# Patient Record
Sex: MALE | Race: WHITE | NOT HISPANIC OR LATINO | Employment: OTHER | URBAN - METROPOLITAN AREA
[De-identification: names, ages, dates, MRNs, and addresses within clinical notes are randomized per-mention and may not be internally consistent; named-entity substitution may affect disease eponyms.]

---

## 2017-04-17 ENCOUNTER — APPOINTMENT (OUTPATIENT)
Dept: LAB | Facility: HOSPITAL | Age: 82
End: 2017-04-17
Payer: MEDICARE

## 2017-04-17 ENCOUNTER — TRANSCRIBE ORDERS (OUTPATIENT)
Dept: ADMINISTRATIVE | Facility: HOSPITAL | Age: 82
End: 2017-04-17

## 2017-04-17 DIAGNOSIS — N40.0 BENIGN PROSTATIC HYPERPLASIA, PRESENCE OF LOWER URINARY TRACT SYMPTOMS UNSPECIFIED, UNSPECIFIED MORPHOLOGY: ICD-10-CM

## 2017-04-17 DIAGNOSIS — N40.0 BENIGN PROSTATIC HYPERPLASIA, PRESENCE OF LOWER URINARY TRACT SYMPTOMS UNSPECIFIED, UNSPECIFIED MORPHOLOGY: Primary | ICD-10-CM

## 2017-04-17 LAB — PSA SERPL-MCNC: 6.4 NG/ML (ref 0–4)

## 2017-04-17 PROCEDURE — 36415 COLL VENOUS BLD VENIPUNCTURE: CPT

## 2017-04-17 PROCEDURE — G0103 PSA SCREENING: HCPCS

## 2017-04-24 ENCOUNTER — APPOINTMENT (OUTPATIENT)
Dept: LAB | Facility: HOSPITAL | Age: 82
End: 2017-04-24
Payer: MEDICARE

## 2017-04-24 ENCOUNTER — TRANSCRIBE ORDERS (OUTPATIENT)
Dept: ADMINISTRATIVE | Facility: HOSPITAL | Age: 82
End: 2017-04-24

## 2017-04-24 DIAGNOSIS — D64.9 ANEMIA, UNSPECIFIED: ICD-10-CM

## 2017-04-24 DIAGNOSIS — E13.9 OTHER SPECIFIED DIABETES MELLITUS: Primary | ICD-10-CM

## 2017-04-24 DIAGNOSIS — E13.9 OTHER SPECIFIED DIABETES MELLITUS: ICD-10-CM

## 2017-04-24 DIAGNOSIS — N40.0 BENIGN PROSTATIC HYPERPLASIA, PRESENCE OF LOWER URINARY TRACT SYMPTOMS UNSPECIFIED, UNSPECIFIED MORPHOLOGY: ICD-10-CM

## 2017-04-24 LAB
ALBUMIN SERPL BCP-MCNC: 3.5 G/DL (ref 3.5–5)
ALP SERPL-CCNC: 78 U/L (ref 46–116)
ALT SERPL W P-5'-P-CCNC: 27 U/L (ref 12–78)
ANION GAP SERPL CALCULATED.3IONS-SCNC: 3 MMOL/L (ref 4–13)
AST SERPL W P-5'-P-CCNC: 28 U/L (ref 5–45)
BASOPHILS # BLD AUTO: 0.03 THOUSANDS/ΜL (ref 0–0.1)
BASOPHILS NFR BLD AUTO: 1 % (ref 0–1)
BILIRUB SERPL-MCNC: 1.2 MG/DL (ref 0.2–1)
BUN SERPL-MCNC: 23 MG/DL (ref 5–25)
CALCIUM SERPL-MCNC: 8.7 MG/DL (ref 8.3–10.1)
CHLORIDE SERPL-SCNC: 109 MMOL/L (ref 100–108)
CO2 SERPL-SCNC: 30 MMOL/L (ref 21–32)
CREAT SERPL-MCNC: 0.8 MG/DL (ref 0.6–1.3)
EOSINOPHIL # BLD AUTO: 0.08 THOUSAND/ΜL (ref 0–0.61)
EOSINOPHIL NFR BLD AUTO: 2 % (ref 0–6)
ERYTHROCYTE [DISTWIDTH] IN BLOOD BY AUTOMATED COUNT: 13.9 % (ref 11.6–15.1)
GFR SERPL CREATININE-BSD FRML MDRD: >60 ML/MIN/1.73SQ M
GLUCOSE P FAST SERPL-MCNC: 132 MG/DL (ref 65–99)
HCT VFR BLD AUTO: 39.9 % (ref 36.5–49.3)
HGB BLD-MCNC: 13 G/DL (ref 12–17)
LYMPHOCYTES # BLD AUTO: 2.06 THOUSANDS/ΜL (ref 0.6–4.47)
LYMPHOCYTES NFR BLD AUTO: 40 % (ref 14–44)
MCH RBC QN AUTO: 34.8 PG (ref 26.8–34.3)
MCHC RBC AUTO-ENTMCNC: 32.6 G/DL (ref 31.4–37.4)
MCV RBC AUTO: 107 FL (ref 82–98)
MONOCYTES # BLD AUTO: 0.7 THOUSAND/ΜL (ref 0.17–1.22)
MONOCYTES NFR BLD AUTO: 14 % (ref 4–12)
NEUTROPHILS # BLD AUTO: 2.27 THOUSANDS/ΜL (ref 1.85–7.62)
NEUTS SEG NFR BLD AUTO: 43 % (ref 43–75)
PLATELET # BLD AUTO: 109 THOUSANDS/UL (ref 149–390)
PMV BLD AUTO: 11.7 FL (ref 8.9–12.7)
POTASSIUM SERPL-SCNC: 4.6 MMOL/L (ref 3.5–5.3)
PROT SERPL-MCNC: 7 G/DL (ref 6.4–8.2)
PSA SERPL-MCNC: 6 NG/ML (ref 0–4)
RBC # BLD AUTO: 3.74 MILLION/UL (ref 3.88–5.62)
SODIUM SERPL-SCNC: 142 MMOL/L (ref 136–145)
WBC # BLD AUTO: 5.14 THOUSAND/UL (ref 4.31–10.16)

## 2017-04-24 PROCEDURE — 80053 COMPREHEN METABOLIC PANEL: CPT

## 2017-04-24 PROCEDURE — 36415 COLL VENOUS BLD VENIPUNCTURE: CPT

## 2017-04-24 PROCEDURE — G0103 PSA SCREENING: HCPCS

## 2017-04-24 PROCEDURE — 85025 COMPLETE CBC W/AUTO DIFF WBC: CPT

## 2017-09-11 ENCOUNTER — TRANSCRIBE ORDERS (OUTPATIENT)
Dept: ADMINISTRATIVE | Facility: HOSPITAL | Age: 82
End: 2017-09-11

## 2017-09-11 ENCOUNTER — LAB (OUTPATIENT)
Dept: LAB | Facility: HOSPITAL | Age: 82
End: 2017-09-11
Payer: MEDICARE

## 2017-09-11 DIAGNOSIS — E78.00 PURE HYPERCHOLESTEROLEMIA: ICD-10-CM

## 2017-09-11 DIAGNOSIS — E13.9 OTHER SPECIFIED DIABETES MELLITUS: ICD-10-CM

## 2017-09-11 DIAGNOSIS — E78.00 PURE HYPERCHOLESTEROLEMIA: Primary | ICD-10-CM

## 2017-09-11 DIAGNOSIS — N40.0 BENIGN PROSTATIC HYPERPLASIA, PRESENCE OF LOWER URINARY TRACT SYMPTOMS UNSPECIFIED, UNSPECIFIED MORPHOLOGY: ICD-10-CM

## 2017-09-11 LAB
ALBUMIN SERPL BCP-MCNC: 3.5 G/DL (ref 3.5–5)
ALP SERPL-CCNC: 77 U/L (ref 46–116)
ALT SERPL W P-5'-P-CCNC: 23 U/L (ref 12–78)
ANION GAP SERPL CALCULATED.3IONS-SCNC: 4 MMOL/L (ref 4–13)
AST SERPL W P-5'-P-CCNC: 30 U/L (ref 5–45)
BACTERIA UR QL AUTO: ABNORMAL /HPF
BASOPHILS # BLD AUTO: 0.03 THOUSANDS/ΜL (ref 0–0.1)
BASOPHILS NFR BLD AUTO: 1 % (ref 0–1)
BILIRUB SERPL-MCNC: 1.5 MG/DL (ref 0.2–1)
BILIRUB UR QL STRIP: NEGATIVE
BUN SERPL-MCNC: 26 MG/DL (ref 5–25)
CALCIUM SERPL-MCNC: 8.9 MG/DL (ref 8.3–10.1)
CHLORIDE SERPL-SCNC: 103 MMOL/L (ref 100–108)
CHOLEST SERPL-MCNC: 159 MG/DL (ref 50–200)
CLARITY UR: CLEAR
CO2 SERPL-SCNC: 32 MMOL/L (ref 21–32)
COLOR UR: YELLOW
CREAT SERPL-MCNC: 0.84 MG/DL (ref 0.6–1.3)
EOSINOPHIL # BLD AUTO: 0.05 THOUSAND/ΜL (ref 0–0.61)
EOSINOPHIL NFR BLD AUTO: 1 % (ref 0–6)
ERYTHROCYTE [DISTWIDTH] IN BLOOD BY AUTOMATED COUNT: 13.7 % (ref 11.6–15.1)
GFR SERPL CREATININE-BSD FRML MDRD: 78 ML/MIN/1.73SQ M
GLUCOSE P FAST SERPL-MCNC: 111 MG/DL (ref 65–99)
GLUCOSE UR STRIP-MCNC: NEGATIVE MG/DL
HCT VFR BLD AUTO: 38.6 % (ref 36.5–49.3)
HDLC SERPL-MCNC: 71 MG/DL (ref 40–60)
HGB BLD-MCNC: 12.8 G/DL (ref 12–17)
HGB UR QL STRIP.AUTO: NEGATIVE
KETONES UR STRIP-MCNC: NEGATIVE MG/DL
LDLC SERPL CALC-MCNC: 81 MG/DL (ref 0–100)
LEUKOCYTE ESTERASE UR QL STRIP: NEGATIVE
LYMPHOCYTES # BLD AUTO: 1.69 THOUSANDS/ΜL (ref 0.6–4.47)
LYMPHOCYTES NFR BLD AUTO: 37 % (ref 14–44)
MCH RBC QN AUTO: 35.1 PG (ref 26.8–34.3)
MCHC RBC AUTO-ENTMCNC: 33.2 G/DL (ref 31.4–37.4)
MCV RBC AUTO: 106 FL (ref 82–98)
MONOCYTES # BLD AUTO: 0.67 THOUSAND/ΜL (ref 0.17–1.22)
MONOCYTES NFR BLD AUTO: 15 % (ref 4–12)
NEUTROPHILS # BLD AUTO: 2.09 THOUSANDS/ΜL (ref 1.85–7.62)
NEUTS SEG NFR BLD AUTO: 46 % (ref 43–75)
NITRITE UR QL STRIP: NEGATIVE
NON-SQ EPI CELLS URNS QL MICRO: ABNORMAL /HPF
PH UR STRIP.AUTO: 7 [PH] (ref 4.5–8)
PLATELET # BLD AUTO: 120 THOUSANDS/UL (ref 149–390)
PMV BLD AUTO: 11.8 FL (ref 8.9–12.7)
POTASSIUM SERPL-SCNC: 4.1 MMOL/L (ref 3.5–5.3)
PROT SERPL-MCNC: 7 G/DL (ref 6.4–8.2)
PROT UR STRIP-MCNC: ABNORMAL MG/DL
PSA SERPL-MCNC: 7.1 NG/ML (ref 0–4)
RBC # BLD AUTO: 3.65 MILLION/UL (ref 3.88–5.62)
RBC #/AREA URNS AUTO: ABNORMAL /HPF
SODIUM SERPL-SCNC: 139 MMOL/L (ref 136–145)
SP GR UR STRIP.AUTO: 1.01 (ref 1–1.03)
T4 FREE SERPL-MCNC: 1.28 NG/DL (ref 0.76–1.46)
TRIGL SERPL-MCNC: 36 MG/DL
TSH SERPL DL<=0.05 MIU/L-ACNC: 4.55 UIU/ML (ref 0.36–3.74)
UROBILINOGEN UR QL STRIP.AUTO: 0.2 E.U./DL
WBC # BLD AUTO: 4.53 THOUSAND/UL (ref 4.31–10.16)
WBC #/AREA URNS AUTO: ABNORMAL /HPF

## 2017-09-11 PROCEDURE — 80061 LIPID PANEL: CPT

## 2017-09-11 PROCEDURE — 36415 COLL VENOUS BLD VENIPUNCTURE: CPT

## 2017-09-11 PROCEDURE — 81001 URINALYSIS AUTO W/SCOPE: CPT | Performed by: FAMILY MEDICINE

## 2017-09-11 PROCEDURE — 84443 ASSAY THYROID STIM HORMONE: CPT

## 2017-09-11 PROCEDURE — 84439 ASSAY OF FREE THYROXINE: CPT

## 2017-09-11 PROCEDURE — G0103 PSA SCREENING: HCPCS

## 2017-09-11 PROCEDURE — 85025 COMPLETE CBC W/AUTO DIFF WBC: CPT

## 2017-09-11 PROCEDURE — 80053 COMPREHEN METABOLIC PANEL: CPT

## 2018-01-01 ENCOUNTER — TRANSCRIBE ORDERS (OUTPATIENT)
Dept: ADMINISTRATIVE | Facility: HOSPITAL | Age: 83
End: 2018-01-01

## 2018-01-01 ENCOUNTER — APPOINTMENT (OUTPATIENT)
Dept: LAB | Facility: HOSPITAL | Age: 83
End: 2018-01-01
Payer: MEDICARE

## 2018-01-01 DIAGNOSIS — I51.9 MYXEDEMA HEART DISEASE: ICD-10-CM

## 2018-01-01 DIAGNOSIS — R53.83 FATIGUE DUE TO DEPRESSION: Primary | ICD-10-CM

## 2018-01-01 DIAGNOSIS — D50.0 IRON DEFICIENCY ANEMIA DUE TO CHRONIC BLOOD LOSS: ICD-10-CM

## 2018-01-01 DIAGNOSIS — E08.00 DIABETES MELLITUS DUE TO UNDERLYING CONDITION WITH HYPEROSMOLARITY WITHOUT COMA, WITHOUT LONG-TERM CURRENT USE OF INSULIN (HCC): ICD-10-CM

## 2018-01-01 DIAGNOSIS — F32.A FATIGUE DUE TO DEPRESSION: ICD-10-CM

## 2018-01-01 DIAGNOSIS — R53.83 FATIGUE DUE TO DEPRESSION: ICD-10-CM

## 2018-01-01 DIAGNOSIS — F32.A FATIGUE DUE TO DEPRESSION: Primary | ICD-10-CM

## 2018-01-01 DIAGNOSIS — E78.00 PURE HYPERCHOLESTEROLEMIA: ICD-10-CM

## 2018-01-01 DIAGNOSIS — E03.9 MYXEDEMA HEART DISEASE: ICD-10-CM

## 2018-01-01 DIAGNOSIS — E08.00 DIABETES MELLITUS DUE TO UNDERLYING CONDITION WITH HYPEROSMOLARITY WITHOUT COMA, WITHOUT LONG-TERM CURRENT USE OF INSULIN (HCC): Primary | ICD-10-CM

## 2018-01-01 LAB
25(OH)D3 SERPL-MCNC: 30.2 NG/ML (ref 30–100)
ALBUMIN SERPL BCP-MCNC: 3.3 G/DL (ref 3.5–5)
ALP SERPL-CCNC: 79 U/L (ref 46–116)
ALT SERPL W P-5'-P-CCNC: 19 U/L (ref 12–78)
ANION GAP SERPL CALCULATED.3IONS-SCNC: 7 MMOL/L (ref 4–13)
AST SERPL W P-5'-P-CCNC: 27 U/L (ref 5–45)
BASOPHILS # BLD AUTO: 0.05 THOUSANDS/ΜL (ref 0–0.1)
BASOPHILS NFR BLD AUTO: 1 % (ref 0–1)
BILIRUB SERPL-MCNC: 1.2 MG/DL (ref 0.2–1)
BUN SERPL-MCNC: 23 MG/DL (ref 5–25)
CALCIUM SERPL-MCNC: 8.2 MG/DL (ref 8.3–10.1)
CHLORIDE SERPL-SCNC: 102 MMOL/L (ref 100–108)
CHOLEST SERPL-MCNC: 159 MG/DL (ref 50–200)
CO2 SERPL-SCNC: 30 MMOL/L (ref 21–32)
CREAT SERPL-MCNC: 0.87 MG/DL (ref 0.6–1.3)
EOSINOPHIL # BLD AUTO: 0.07 THOUSAND/ΜL (ref 0–0.61)
EOSINOPHIL NFR BLD AUTO: 2 % (ref 0–6)
ERYTHROCYTE [DISTWIDTH] IN BLOOD BY AUTOMATED COUNT: 13.2 % (ref 11.6–15.1)
EST. AVERAGE GLUCOSE BLD GHB EST-MCNC: 126 MG/DL
FOLATE SERPL-MCNC: >20 NG/ML (ref 3.1–17.5)
GFR SERPL CREATININE-BSD FRML MDRD: 76 ML/MIN/1.73SQ M
GLUCOSE P FAST SERPL-MCNC: 116 MG/DL (ref 65–99)
HBA1C MFR BLD: 6 % (ref 4.2–6.3)
HCT VFR BLD AUTO: 39.9 % (ref 36.5–49.3)
HDLC SERPL-MCNC: 64 MG/DL (ref 40–60)
HGB BLD-MCNC: 13.5 G/DL (ref 12–17)
LDLC SERPL CALC-MCNC: 85 MG/DL (ref 0–100)
LYMPHOCYTES # BLD AUTO: 1.52 THOUSANDS/ΜL (ref 0.6–4.47)
LYMPHOCYTES NFR BLD AUTO: 33 % (ref 14–44)
MCH RBC QN AUTO: 36.1 PG (ref 26.8–34.3)
MCHC RBC AUTO-ENTMCNC: 33.8 G/DL (ref 31.4–37.4)
MCV RBC AUTO: 107 FL (ref 82–98)
MONOCYTES # BLD AUTO: 0.65 THOUSAND/ΜL (ref 0.17–1.22)
MONOCYTES NFR BLD AUTO: 14 % (ref 4–12)
NEUTROPHILS # BLD AUTO: 2.31 THOUSANDS/ΜL (ref 1.85–7.62)
NEUTS SEG NFR BLD AUTO: 50 % (ref 43–75)
PLATELET # BLD AUTO: 132 THOUSANDS/UL (ref 149–390)
PMV BLD AUTO: 11.4 FL (ref 8.9–12.7)
POTASSIUM SERPL-SCNC: 4.1 MMOL/L (ref 3.5–5.3)
PROT SERPL-MCNC: 7.1 G/DL (ref 6.4–8.2)
PSA SERPL-MCNC: 7.6 NG/ML (ref 0–4)
RBC # BLD AUTO: 3.74 MILLION/UL (ref 3.88–5.62)
SODIUM SERPL-SCNC: 139 MMOL/L (ref 136–145)
T4 FREE SERPL-MCNC: 1.23 NG/DL (ref 0.76–1.46)
TRIGL SERPL-MCNC: 52 MG/DL
TSH SERPL DL<=0.05 MIU/L-ACNC: 4.55 UIU/ML (ref 0.36–3.74)
VIT B12 SERPL-MCNC: 504 PG/ML (ref 100–900)
WBC # BLD AUTO: 4.6 THOUSAND/UL (ref 4.31–10.16)

## 2018-01-01 PROCEDURE — 82607 VITAMIN B-12: CPT

## 2018-01-01 PROCEDURE — 83036 HEMOGLOBIN GLYCOSYLATED A1C: CPT

## 2018-01-01 PROCEDURE — 85025 COMPLETE CBC W/AUTO DIFF WBC: CPT

## 2018-01-01 PROCEDURE — G0103 PSA SCREENING: HCPCS

## 2018-01-01 PROCEDURE — 84439 ASSAY OF FREE THYROXINE: CPT

## 2018-01-01 PROCEDURE — 84443 ASSAY THYROID STIM HORMONE: CPT

## 2018-01-01 PROCEDURE — 80061 LIPID PANEL: CPT

## 2018-01-01 PROCEDURE — 82746 ASSAY OF FOLIC ACID SERUM: CPT

## 2018-01-01 PROCEDURE — 36415 COLL VENOUS BLD VENIPUNCTURE: CPT

## 2018-01-01 PROCEDURE — 80053 COMPREHEN METABOLIC PANEL: CPT

## 2018-01-01 PROCEDURE — 82306 VITAMIN D 25 HYDROXY: CPT

## 2019-01-01 ENCOUNTER — APPOINTMENT (OUTPATIENT)
Dept: LAB | Facility: HOSPITAL | Age: 84
End: 2019-01-01
Payer: MEDICARE

## 2019-01-01 ENCOUNTER — DOCUMENTATION (OUTPATIENT)
Dept: MEDSURG UNIT | Facility: HOSPITAL | Age: 84
End: 2019-01-01

## 2019-01-01 ENCOUNTER — HOSPITAL ENCOUNTER (OUTPATIENT)
Dept: PULMONOLOGY | Facility: HOSPITAL | Age: 84
Discharge: HOME | End: 2019-02-04
Payer: MEDICARE

## 2019-01-01 ENCOUNTER — APPOINTMENT (EMERGENCY)
Dept: CT IMAGING | Facility: HOSPITAL | Age: 84
DRG: 291 | End: 2019-01-01
Payer: MEDICARE

## 2019-01-01 ENCOUNTER — HOSPITAL ENCOUNTER (INPATIENT)
Facility: HOSPITAL | Age: 84
LOS: 1 days | DRG: 291 | End: 2019-02-12
Attending: EMERGENCY MEDICINE | Admitting: INTERNAL MEDICINE
Payer: MEDICARE

## 2019-01-01 ENCOUNTER — HOSPITAL ENCOUNTER (EMERGENCY)
Facility: HOSPITAL | Age: 84
Discharge: HOME | DRG: 291 | End: 2019-02-09
Attending: EMERGENCY MEDICINE | Admitting: EMERGENCY MEDICINE
Payer: MEDICARE

## 2019-01-01 ENCOUNTER — TRANSCRIBE ORDERS (OUTPATIENT)
Dept: ADMINISTRATIVE | Facility: HOSPITAL | Age: 84
End: 2019-01-01

## 2019-01-01 ENCOUNTER — HOSPITAL ENCOUNTER (OUTPATIENT)
Dept: RADIOLOGY | Facility: HOSPITAL | Age: 84
Discharge: HOME | End: 2019-01-16
Payer: MEDICARE

## 2019-01-01 VITALS
BODY MASS INDEX: 32.2 KG/M2 | HEART RATE: 75 BPM | OXYGEN SATURATION: 96 % | DIASTOLIC BLOOD PRESSURE: 58 MMHG | WEIGHT: 175 LBS | SYSTOLIC BLOOD PRESSURE: 112 MMHG | RESPIRATION RATE: 14 BRPM | HEIGHT: 62 IN | TEMPERATURE: 97.9 F

## 2019-01-01 VITALS
TEMPERATURE: 97.5 F | HEIGHT: 62 IN | DIASTOLIC BLOOD PRESSURE: 71 MMHG | OXYGEN SATURATION: 72 % | WEIGHT: 175 LBS | HEART RATE: 113 BPM | BODY MASS INDEX: 32.2 KG/M2 | SYSTOLIC BLOOD PRESSURE: 135 MMHG | RESPIRATION RATE: 22 BRPM

## 2019-01-01 DIAGNOSIS — R41.82 ALTERED MENTAL STATUS, UNSPECIFIED ALTERED MENTAL STATUS TYPE: Primary | ICD-10-CM

## 2019-01-01 DIAGNOSIS — J44.9 COPD (CHRONIC OBSTRUCTIVE PULMONARY DISEASE) (HCC): ICD-10-CM

## 2019-01-01 DIAGNOSIS — S20.212A CONTUSION OF LEFT CHEST WALL, INITIAL ENCOUNTER: ICD-10-CM

## 2019-01-01 DIAGNOSIS — I50.22 CHRONIC SYSTOLIC CONGESTIVE HEART FAILURE (HCC): ICD-10-CM

## 2019-01-01 DIAGNOSIS — R79.89 HYPOURICEMIA: ICD-10-CM

## 2019-01-01 DIAGNOSIS — R05.9 COUGH: ICD-10-CM

## 2019-01-01 DIAGNOSIS — S00.83XA CONTUSION OF FACE, INITIAL ENCOUNTER: ICD-10-CM

## 2019-01-01 DIAGNOSIS — I42.9 FAMILIAL CARDIOMYOPATHY (HCC): Primary | ICD-10-CM

## 2019-01-01 DIAGNOSIS — I42.9 FAMILIAL CARDIOMYOPATHY (HCC): ICD-10-CM

## 2019-01-01 DIAGNOSIS — R05.9 COUGH: Primary | ICD-10-CM
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
## 2019-01-01 LAB
ALBUMIN SERPL BCP-MCNC: 3 G/DL (ref 3.5–5)
ALBUMIN SERPL BCP-MCNC: 3.2 G/DL (ref 3.5–5)
ALP SERPL-CCNC: 120 U/L (ref 46–116)
ALP SERPL-CCNC: 91 U/L (ref 46–116)
ALT SERPL W P-5'-P-CCNC: 27 U/L (ref 12–78)
ALT SERPL W P-5'-P-CCNC: 35 U/L (ref 12–78)
ANION GAP SERPL CALCULATED.3IONS-SCNC: 5 MMOL/L (ref 4–13)
ANION GAP SERPL CALCULATED.3IONS-SCNC: 7 MMOL/L (ref 4–13)
APTT PPP: 32 SECONDS (ref 26–38)
ARTERIAL PATENCY WRIST A: ABNORMAL
AST SERPL W P-5'-P-CCNC: 30 U/L (ref 5–45)
AST SERPL W P-5'-P-CCNC: 56 U/L (ref 5–45)
ATRIAL RATE: 94 BPM
BASE EXCESS BLDA CALC-SCNC: 4 MMOL/L (ref -2–3)
BASOPHILS # BLD AUTO: 0.03 THOUSANDS/ΜL (ref 0–0.1)
BASOPHILS # BLD AUTO: 0.06 THOUSANDS/ΜL (ref 0–0.1)
BASOPHILS NFR BLD AUTO: 0 % (ref 0–1)
BASOPHILS NFR BLD AUTO: 1 % (ref 0–1)
BILIRUB DIRECT SERPL-MCNC: 0.83 MG/DL (ref 0–0.2)
BILIRUB SERPL-MCNC: 1.5 MG/DL (ref 0.2–1)
BILIRUB SERPL-MCNC: 2.1 MG/DL (ref 0.2–1)
BILIRUB UR QL STRIP: NEGATIVE
BUN SERPL-MCNC: 29 MG/DL (ref 5–25)
BUN SERPL-MCNC: 32 MG/DL (ref 5–25)
CALCIUM SERPL-MCNC: 8.5 MG/DL (ref 8.3–10.1)
CALCIUM SERPL-MCNC: 8.9 MG/DL (ref 8.3–10.1)
CHLORIDE SERPL-SCNC: 100 MMOL/L (ref 100–108)
CHLORIDE SERPL-SCNC: 102 MMOL/L (ref 100–108)
CLARITY UR: CLEAR
CO2 SERPL-SCNC: 32 MMOL/L (ref 21–32)
CO2 SERPL-SCNC: 34 MMOL/L (ref 21–32)
COLOR UR: YELLOW
CREAT SERPL-MCNC: 1.05 MG/DL (ref 0.6–1.3)
CREAT SERPL-MCNC: 1.17 MG/DL (ref 0.6–1.3)
EOSINOPHIL # BLD AUTO: 0.03 THOUSAND/ΜL (ref 0–0.61)
EOSINOPHIL # BLD AUTO: 0.05 THOUSAND/ΜL (ref 0–0.61)
EOSINOPHIL NFR BLD AUTO: 0 % (ref 0–6)
EOSINOPHIL NFR BLD AUTO: 1 % (ref 0–6)
ERYTHROCYTE [DISTWIDTH] IN BLOOD BY AUTOMATED COUNT: 14.7 % (ref 11.6–15.1)
ERYTHROCYTE [DISTWIDTH] IN BLOOD BY AUTOMATED COUNT: 15.6 % (ref 11.6–15.1)
GFR SERPL CREATININE-BSD FRML MDRD: 54 ML/MIN/1.73SQ M
GFR SERPL CREATININE-BSD FRML MDRD: 62 ML/MIN/1.73SQ M
GLUCOSE SERPL-MCNC: 108 MG/DL (ref 65–140)
GLUCOSE SERPL-MCNC: 135 MG/DL (ref 65–140)
GLUCOSE UR STRIP-MCNC: NEGATIVE MG/DL
HCO3 BLDA-SCNC: 27.9 MMOL/L (ref 22–28)
HCT VFR BLD AUTO: 37.2 % (ref 36.5–49.3)
HCT VFR BLD AUTO: 38.2 % (ref 36.5–49.3)
HGB BLD-MCNC: 12.3 G/DL (ref 12–17)
HGB BLD-MCNC: 12.5 G/DL (ref 12–17)
HGB UR QL STRIP.AUTO: NEGATIVE
IMM GRANULOCYTES # BLD AUTO: 0.03 THOUSAND/UL (ref 0–0.2)
IMM GRANULOCYTES # BLD AUTO: 0.03 THOUSAND/UL (ref 0–0.2)
IMM GRANULOCYTES NFR BLD AUTO: 0 % (ref 0–2)
IMM GRANULOCYTES NFR BLD AUTO: 1 % (ref 0–2)
INR PPP: 1.19 (ref 0.86–1.17)
KETONES UR STRIP-MCNC: NEGATIVE MG/DL
LEUKOCYTE ESTERASE UR QL STRIP: NEGATIVE
LYMPHOCYTES # BLD AUTO: 0.81 THOUSANDS/ΜL (ref 0.6–4.47)
LYMPHOCYTES # BLD AUTO: 1.39 THOUSANDS/ΜL (ref 0.6–4.47)
LYMPHOCYTES NFR BLD AUTO: 10 % (ref 14–44)
LYMPHOCYTES NFR BLD AUTO: 23 % (ref 14–44)
MCH RBC QN AUTO: 36.1 PG (ref 26.8–34.3)
MCH RBC QN AUTO: 36.4 PG (ref 26.8–34.3)
MCHC RBC AUTO-ENTMCNC: 32.7 G/DL (ref 31.4–37.4)
MCHC RBC AUTO-ENTMCNC: 33.1 G/DL (ref 31.4–37.4)
MCV RBC AUTO: 110 FL (ref 82–98)
MCV RBC AUTO: 110 FL (ref 82–98)
MONOCYTES # BLD AUTO: 1.11 THOUSAND/ΜL (ref 0.17–1.22)
MONOCYTES # BLD AUTO: 1.25 THOUSAND/ΜL (ref 0.17–1.22)
MONOCYTES NFR BLD AUTO: 15 % (ref 4–12)
MONOCYTES NFR BLD AUTO: 19 % (ref 4–12)
NEUTROPHILS # BLD AUTO: 3.29 THOUSANDS/ΜL (ref 1.85–7.62)
NEUTROPHILS # BLD AUTO: 5.98 THOUSANDS/ΜL (ref 1.85–7.62)
NEUTS SEG NFR BLD AUTO: 55 % (ref 43–75)
NEUTS SEG NFR BLD AUTO: 75 % (ref 43–75)
NITRITE UR QL STRIP: NEGATIVE
NRBC BLD AUTO-RTO: 0 /100 WBCS
NRBC BLD AUTO-RTO: 0 /100 WBCS
NT-PROBNP SERPL-MCNC: 6955 PG/ML
PCO2 BLD: 29 MMOL/L (ref 21–32)
PCO2 BLD: 40.4 MM HG (ref 36–44)
PH BLD: 7.45 [PH] (ref 7.35–7.45)
PH UR STRIP.AUTO: 6 [PH] (ref 4.5–8)
PLATELET # BLD AUTO: 124 THOUSANDS/UL (ref 149–390)
PLATELET # BLD AUTO: 127 THOUSANDS/UL (ref 149–390)
PMV BLD AUTO: 12.7 FL (ref 8.9–12.7)
PMV BLD AUTO: 13.3 FL (ref 8.9–12.7)
PO2 BLD: 79 MM HG (ref 75–129)
POTASSIUM SERPL-SCNC: 3.8 MMOL/L (ref 3.5–5.3)
POTASSIUM SERPL-SCNC: 4.6 MMOL/L (ref 3.5–5.3)
PROT SERPL-MCNC: 6.9 G/DL (ref 6.4–8.2)
PROT SERPL-MCNC: 7 G/DL (ref 6.4–8.2)
PROT UR STRIP-MCNC: NEGATIVE MG/DL
PROTHROMBIN TIME: 14.4 SECONDS (ref 11.8–14.2)
QRS AXIS: -57 DEGREES
QRSD INTERVAL: 156 MS
QT INTERVAL: 426 MS
QTC INTERVAL: 500 MS
RBC # BLD AUTO: 3.38 MILLION/UL (ref 3.88–5.62)
RBC # BLD AUTO: 3.46 MILLION/UL (ref 3.88–5.62)
SAMPLE SITE: ABNORMAL
SAO2 % BLD FROM PO2: 96 % (ref 95–98)
SODIUM SERPL-SCNC: 139 MMOL/L (ref 136–145)
SODIUM SERPL-SCNC: 141 MMOL/L (ref 136–145)
SP GR UR STRIP.AUTO: 1.01 (ref 1–1.03)
SPECIMEN SOURCE: ABNORMAL
T WAVE AXIS: 152 DEGREES
TROPONIN I SERPL-MCNC: 0.05 NG/ML
UROBILINOGEN UR QL STRIP.AUTO: 1 E.U./DL
VENTRICULAR RATE: 83 BPM
WBC # BLD AUTO: 5.93 THOUSAND/UL (ref 4.31–10.16)
WBC # BLD AUTO: 8.13 THOUSAND/UL (ref 4.31–10.16)

## 2019-01-01 PROCEDURE — 99232 SBSQ HOSP IP/OBS MODERATE 35: CPT | Performed by: INTERNAL MEDICINE

## 2019-01-01 PROCEDURE — 70450 CT HEAD/BRAIN W/O DYE: CPT

## 2019-01-01 PROCEDURE — 99285 EMERGENCY DEPT VISIT HI MDM: CPT

## 2019-01-01 PROCEDURE — 82803 BLOOD GASES ANY COMBINATION: CPT

## 2019-01-01 PROCEDURE — 99284 EMERGENCY DEPT VISIT MOD MDM: CPT

## 2019-01-01 PROCEDURE — 99223 1ST HOSP IP/OBS HIGH 75: CPT | Performed by: PHYSICIAN ASSISTANT

## 2019-01-01 PROCEDURE — 85610 PROTHROMBIN TIME: CPT | Performed by: EMERGENCY MEDICINE

## 2019-01-01 PROCEDURE — 36415 COLL VENOUS BLD VENIPUNCTURE: CPT | Performed by: EMERGENCY MEDICINE

## 2019-01-01 PROCEDURE — 80048 BASIC METABOLIC PNL TOTAL CA: CPT | Performed by: EMERGENCY MEDICINE

## 2019-01-01 PROCEDURE — 71046 X-RAY EXAM CHEST 2 VIEWS: CPT

## 2019-01-01 PROCEDURE — 85025 COMPLETE CBC W/AUTO DIFF WBC: CPT | Performed by: EMERGENCY MEDICINE

## 2019-01-01 PROCEDURE — 85730 THROMBOPLASTIN TIME PARTIAL: CPT | Performed by: EMERGENCY MEDICINE

## 2019-01-01 PROCEDURE — 83880 ASSAY OF NATRIURETIC PEPTIDE: CPT

## 2019-01-01 PROCEDURE — 93010 ELECTROCARDIOGRAM REPORT: CPT | Performed by: INTERNAL MEDICINE

## 2019-01-01 PROCEDURE — 99238 HOSP IP/OBS DSCHRG MGMT 30/<: CPT | Performed by: NURSE PRACTITIONER

## 2019-01-01 PROCEDURE — 71260 CT THORAX DX C+: CPT

## 2019-01-01 PROCEDURE — 36600 WITHDRAWAL OF ARTERIAL BLOOD: CPT

## 2019-01-01 PROCEDURE — 80053 COMPREHEN METABOLIC PANEL: CPT

## 2019-01-01 PROCEDURE — 74177 CT ABD & PELVIS W/CONTRAST: CPT

## 2019-01-01 PROCEDURE — 36415 COLL VENOUS BLD VENIPUNCTURE: CPT

## 2019-01-01 PROCEDURE — 81003 URINALYSIS AUTO W/O SCOPE: CPT | Performed by: EMERGENCY MEDICINE

## 2019-01-01 PROCEDURE — 85025 COMPLETE CBC W/AUTO DIFF WBC: CPT

## 2019-01-01 PROCEDURE — 84484 ASSAY OF TROPONIN QUANT: CPT | Performed by: EMERGENCY MEDICINE

## 2019-01-01 PROCEDURE — 93005 ELECTROCARDIOGRAM TRACING: CPT

## 2019-01-01 PROCEDURE — 72125 CT NECK SPINE W/O DYE: CPT

## 2019-01-01 PROCEDURE — 80076 HEPATIC FUNCTION PANEL: CPT | Performed by: EMERGENCY MEDICINE

## 2019-01-01 RX ORDER — GLYCOPYRROLATE 0.2 MG/ML
0.1 INJECTION INTRAMUSCULAR; INTRAVENOUS ONCE
Status: COMPLETED | OUTPATIENT
Start: 2019-01-01 | End: 2019-01-01

## 2019-01-01 RX ORDER — SODIUM CHLORIDE 9 MG/ML
50 INJECTION, SOLUTION INTRAVENOUS CONTINUOUS
Status: DISCONTINUED | OUTPATIENT
Start: 2019-01-01 | End: 2019-01-01 | Stop reason: HOSPADM

## 2019-01-01 RX ORDER — FUROSEMIDE 40 MG/1
80 TABLET ORAL 2 TIMES DAILY
Status: DISCONTINUED | OUTPATIENT
Start: 2019-01-01 | End: 2019-01-01

## 2019-01-01 RX ORDER — LANOLIN ALCOHOL/MO/W.PET/CERES
3 CREAM (GRAM) TOPICAL
Status: DISCONTINUED | OUTPATIENT
Start: 2019-01-01 | End: 2019-01-01 | Stop reason: HOSPADM

## 2019-01-01 RX ORDER — ACETAMINOPHEN 325 MG/1
650 TABLET ORAL EVERY 6 HOURS PRN
Status: DISCONTINUED | OUTPATIENT
Start: 2019-01-01 | End: 2019-01-01 | Stop reason: HOSPADM

## 2019-01-01 RX ORDER — ASPIRIN 81 MG/1
81 TABLET, CHEWABLE ORAL DAILY
Status: DISCONTINUED | OUTPATIENT
Start: 2019-01-01 | End: 2019-01-01 | Stop reason: HOSPADM

## 2019-01-01 RX ORDER — FUROSEMIDE 80 MG
80 TABLET ORAL 2 TIMES DAILY
COMMUNITY
End: 2019-01-01 | Stop reason: HOSPADM

## 2019-01-01 RX ORDER — LORAZEPAM 2 MG/ML
2 INJECTION INTRAMUSCULAR ONCE
Status: COMPLETED | OUTPATIENT
Start: 2019-01-01 | End: 2019-01-01

## 2019-01-01 RX ORDER — FUROSEMIDE 40 MG/1
40 TABLET ORAL 2 TIMES DAILY
Status: DISCONTINUED | OUTPATIENT
Start: 2019-01-01 | End: 2019-01-01 | Stop reason: HOSPADM

## 2019-01-01 RX ORDER — CALCIUM CARBONATE 200(500)MG
1000 TABLET,CHEWABLE ORAL DAILY PRN
Status: DISCONTINUED | OUTPATIENT
Start: 2019-01-01 | End: 2019-01-01 | Stop reason: HOSPADM

## 2019-01-01 RX ORDER — LORAZEPAM 2 MG/ML
1 INJECTION INTRAMUSCULAR AS NEEDED
Status: DISCONTINUED | OUTPATIENT
Start: 2019-01-01 | End: 2019-01-01 | Stop reason: HOSPADM

## 2019-01-01 RX ORDER — MORPHINE SULFATE 10 MG/5ML
10 SOLUTION ORAL EVERY 4 HOURS PRN
Status: DISCONTINUED | OUTPATIENT
Start: 2019-01-01 | End: 2019-01-01 | Stop reason: HOSPADM

## 2019-01-01 RX ORDER — ACETAMINOPHEN 325 MG/1
650 TABLET ORAL ONCE
Status: COMPLETED | OUTPATIENT
Start: 2019-01-01 | End: 2019-01-01

## 2019-01-01 RX ORDER — MORPHINE SULFATE 4 MG/ML
4 INJECTION, SOLUTION INTRAMUSCULAR; INTRAVENOUS ONCE
Status: COMPLETED | OUTPATIENT
Start: 2019-01-01 | End: 2019-01-01

## 2019-01-01 RX ORDER — OLANZAPINE 5 MG/1
5 TABLET, ORALLY DISINTEGRATING ORAL
Status: DISCONTINUED | OUTPATIENT
Start: 2019-01-01 | End: 2019-01-01 | Stop reason: HOSPADM

## 2019-01-01 RX ORDER — METHYLPREDNISOLONE SODIUM SUCCINATE 40 MG/ML
40 INJECTION, POWDER, LYOPHILIZED, FOR SOLUTION INTRAMUSCULAR; INTRAVENOUS EVERY 12 HOURS SCHEDULED
Status: DISCONTINUED | OUTPATIENT
Start: 2019-01-01 | End: 2019-01-01 | Stop reason: HOSPADM

## 2019-01-01 RX ORDER — SCOLOPAMINE TRANSDERMAL SYSTEM 1 MG/1
1 PATCH, EXTENDED RELEASE TRANSDERMAL
Status: DISCONTINUED | OUTPATIENT
Start: 2019-01-01 | End: 2019-01-01 | Stop reason: HOSPADM

## 2019-01-01 RX ORDER — UREA 10 %
3 LOTION (ML) TOPICAL
COMMUNITY
End: 2019-01-01 | Stop reason: HOSPADM

## 2019-01-01 RX ORDER — ALBUTEROL SULFATE 90 UG/1
2 AEROSOL, METERED RESPIRATORY (INHALATION) EVERY 6 HOURS PRN
Status: DISCONTINUED | OUTPATIENT
Start: 2019-01-01 | End: 2019-01-01 | Stop reason: HOSPADM

## 2019-01-01 RX ORDER — METOPROLOL TARTRATE 50 MG/1
50 TABLET, FILM COATED ORAL 2 TIMES DAILY
Status: DISCONTINUED | OUTPATIENT
Start: 2019-01-01 | End: 2019-01-01 | Stop reason: HOSPADM

## 2019-01-01 RX ORDER — SENNOSIDES 8.6 MG
1 TABLET ORAL DAILY
Status: DISCONTINUED | OUTPATIENT
Start: 2019-01-01 | End: 2019-01-01 | Stop reason: HOSPADM

## 2019-01-01 RX ORDER — LISINOPRIL 20 MG/1
40 TABLET ORAL DAILY
Status: DISCONTINUED | OUTPATIENT
Start: 2019-01-01 | End: 2019-01-01

## 2019-01-01 RX ADMIN — LORAZEPAM 1 MG: 2 INJECTION, SOLUTION INTRAMUSCULAR; INTRAVENOUS at 14:53

## 2019-01-01 RX ADMIN — METHYLPREDNISOLONE SODIUM SUCCINATE 40 MG: 40 INJECTION, POWDER, FOR SOLUTION INTRAMUSCULAR; INTRAVENOUS at 10:49

## 2019-01-01 RX ADMIN — ACETAMINOPHEN 650 MG: 325 TABLET, FILM COATED ORAL at 23:01

## 2019-01-01 RX ADMIN — SCOPALAMINE 1 PATCH: 1 PATCH, EXTENDED RELEASE TRANSDERMAL at 11:13

## 2019-01-01 RX ADMIN — SODIUM CHLORIDE 50 ML/HR: 0.9 INJECTION, SOLUTION INTRAVENOUS at 00:05

## 2019-01-01 RX ADMIN — SODIUM CHLORIDE 50 ML/HR: 0.9 INJECTION, SOLUTION INTRAVENOUS at 09:37

## 2019-01-01 RX ADMIN — OLANZAPINE 5 MG: 5 TABLET, ORALLY DISINTEGRATING ORAL at 21:20

## 2019-01-01 RX ADMIN — LORAZEPAM 1 MG: 2 INJECTION, SOLUTION INTRAMUSCULAR; INTRAVENOUS at 06:08

## 2019-01-01 RX ADMIN — LORAZEPAM 1 MG: 2 INJECTION, SOLUTION INTRAMUSCULAR; INTRAVENOUS at 23:37

## 2019-01-01 RX ADMIN — GLYCOPYRROLATE 0.1 MG: 0.2 INJECTION, SOLUTION INTRAMUSCULAR; INTRAVENOUS at 09:06

## 2019-01-01 RX ADMIN — MORPHINE SULFATE 2 MG: 2 INJECTION, SOLUTION INTRAMUSCULAR; INTRAVENOUS at 13:00

## 2019-01-01 RX ADMIN — IOHEXOL 85 ML: 350 INJECTION, SOLUTION INTRAVENOUS at 21:04

## 2019-01-01 RX ADMIN — LORAZEPAM 2 MG: 2 INJECTION, SOLUTION INTRAMUSCULAR; INTRAVENOUS at 09:06

## 2019-01-01 RX ADMIN — MORPHINE SULFATE 4 MG: 4 INJECTION INTRAVENOUS at 09:07

## 2019-02-10 NOTE — ED PROVIDER NOTES
H&P Exam - Trauma   Shantell Murcia 80 y o  male MRN: [id number]  Unit/Bed#: ED 11/ED 11 Encounter: [id number]    Assessment/Plan   Trauma Alert:    Model of Arrival:   via    Trauma Team: Current Providers  Attending Provider: Mick Paul DO  Registered Nurse: Thomas Shanks RN  Consultants: None    Trauma Active Problems:  Blunt trauma to face and chest    Trauma Plan:  Imaging lab work reassess    Chief Complaint:   Chief Complaint   Patient presents with    Fatigue     Patient caled family today to come to him because he felt he was too tired to go on living  He requested that his defibrillator be turned off  He did fall 1 week ago with right sided rib pain  History of Present Illness   HPI:  Shantell Murcia is a 80 y o  male who presents with left rib pain  Mechanism:           History from patient, family and medics  This patient lives with his wife  Family is there during the daytime  He has dementia, chronic atrial fibrillation and COPD  He has been weaker over the last month  He is having difficulty sleeping and rolled at night  Apparently fell 7 days ago and injured his face and left anterolateral chest wall  Tonight he said he was feeling worse and called family asking him to turn off his defibrillator because he would just like to go naturally  After discussion with the medics and he agreed to be evaluated for chest wall pain  The pain is sharp and worse with certain positions and with deep breaths  Patient denies facial or head pain  He denies spine abdominal pelvis and extremity injury  Patient denies focal neurologic symptoms  He does suffer from dyspnea on exertion and has chronic CHF  Review of Systems   Constitutional: Positive for activity change and fatigue  Negative for chills, diaphoresis and fever  HENT: Negative  Eyes: Negative  Respiratory: Positive for cough and shortness of breath  Cardiovascular: Positive for chest pain (See HPI)  Gastrointestinal: Negative  Endocrine: Negative  Genitourinary: Negative  Musculoskeletal: Positive for arthralgias, gait problem and myalgias  Negative for neck pain  Skin: Negative  Allergic/Immunologic: Negative  Hematological: Negative  Psychiatric/Behavioral: Negative  All other systems reviewed and are negative  Historical Information     Immunizations: There is no immunization history on file for this patient  Past Medical History:   Diagnosis Date    A-fib [facility])     Cardiac disease     [facility]congestive [facility] failure) [facility]     COPD (chronic obstructive pulmonary disease) ([facility])     Granddaughter denies   Hypertension        Family History   Problem Relation Age of Onset    Heart disease Mother     Heart disease Father      Past Surgical History:   Procedure Laterality Date    CARDIAC DEFIBRILLATOR PLACEMENT      CARDIAC DEFIBRILLATOR PLACEMENT      CARDIAC PACEMAKER PLACEMENT      CORONARY ARTERY BYPASS GRAFT         Social History     Social History    Marital status: /Civil Union     Spouse name: N/A    Number of children: N/A    Years of education: N/A     Social History Main Topics    Smoking status: Former Smoker     Quit date: 1963    Smokeless tobacco: Never Used    Alcohol use 4 2 oz/week     7 Cans of beer per week    Drug use: No    Sexual activity: Not Asked     Other Topics Concern    None     Social History Narrative    None       Family History:   Family History   Problem Relation Age of Onset    Heart disease Mother     Heart disease Father        Meds/Allergies   Prior to Admission Medications   Prescriptions Last Dose Informant Patient Reported? Taking? albuterol (PROVENTIL HFA,VENTOLIN HFA) 90 mcg/act inhaler   Yes No   Sig: Inhale 2 puffs every 6 (six) hours as needed for wheezing  aspirin 81 MG tablet   Yes No   Sig: Take 81 mg by mouth daily     furosemide (LASIX) 80 mg tablet   Yes Yes   Sig: Take 80 mg by mouth 2 (two) times a day   melatonin 1 mg   Yes Yes   Sig: Take 3 mg by mouth daily at bedtime   metoprolol tartrate (LOPRESSOR) 50 mg tablet   Yes No   Sig: Take 50 mg by mouth 2 (two) times a day  quinapril (ACCUPRIL) 40 MG tablet   Yes No   Sig: Take 40 mg by mouth daily at bedtime  Facility-Administered Medications: None       No Known Allergies    PHYSICAL EXAM    PE limited by:  Dementia and mild left rib pain    Objective   Vitals:   First set: Temperature: 97 9 °F (36 6 °C) (02/09/19 1947)  Pulse: 79 (02/09/19 1947)  Respirations: 16 (02/09/19 1947)  Blood Pressure: 115/68 (02/09/19 1947)  SpO2: 94 % (02/09/19 1947)    Primary Survey:   (A) Airway:  Normal vocalizations  (B) Breathing:  Symmetric air entry and chest rise  (C) Circulation: Pulses:   normal  (D) Disabliity:  GCS Total:  14  (E) Expose:  Completed    Secondary Survey: (Click on Physical Exam tab above)  Physical Exam   Constitutional: He is oriented to person, place, and time  He appears well-developed and well-nourished  No distress  HENT:   Head: Normocephalic  Right Ear: External ear normal    Left Ear: External ear normal    Mouth/Throat: Oropharynx is clear and moist    There is a fading ecchymosis of the nose and blow both eyelids  No nasal tenderness or crepitance  No other signs of bony or dental trauma   Eyes: Pupils are equal, round, and reactive to light  Conjunctivae and EOM are normal    Neck: Normal range of motion  Neck supple  No JVD present  Cardiovascular: Normal rate, regular rhythm, normal heart sounds and intact distal pulses  No murmur heard  Pulmonary/Chest: Effort normal and breath sounds normal  He exhibits tenderness (Mild left-sided chest tenderness without deformity or crepitus)  Abdominal: Soft  Bowel sounds are normal  He exhibits no distension and no mass  There is no tenderness  There is no rebound and no guarding  Musculoskeletal: Normal range of motion  He exhibits no edema or tenderness  Lymphadenopathy:     He has no cervical adenopathy  Neurological: He is alert and oriented to person, place, and time  He has normal reflexes  No cranial nerve deficit or sensory deficit  He exhibits normal muscle tone  Coordination normal    Skin: Skin is warm and dry  Capillary refill takes less than 2 seconds  No rash noted  He is not diaphoretic  Psychiatric: He has a normal mood and affect  His behavior is normal    Nursing note and vitals reviewed        Invasive Devices          No matching active lines, drains, or airways          Lab Results:   Results Reviewed     Procedure Component Value Units Date/Time    UA w Reflex to Microscopic [[id number]] Collected:  02/09/19 2038    Lab Status:  Final result Specimen:  Urine from Urine, Clean Catch Updated:  02/09/19 2119     Color, UA Yellow     Clarity, UA Clear     Specific Gravity, UA 1 015     pH, UA 6 0     Leukocytes, UA Negative     Nitrite, UA Negative     Protein, UA Negative mg/dl      Glucose, UA Negative mg/dl      Ketones, UA Negative mg/dl      Urobilinogen, UA 1 0 E U /dl      Bilirubin, UA Negative     Blood, UA Negative    CBC and differential [[id number]]  (Abnormal) Collected:  02/09/19 2021    Lab Status:  Final result Specimen:  Blood from Arm, Right Updated:  02/09/19 2104     WBC 8 13 Thousand/uL      RBC 3 46 (L) Million/uL      Hemoglobin 12 5 g/dL      Hematocrit 38 2 %       (H) fL      MCH 36 1 (H) pg      MCHC 32 7 g/dL      RDW 15 6 (H) %      MPV 13 3 (H) fL      Platelets 475 (L) Thousands/uL      nRBC 0 /100 WBCs      Neutrophils Relative 75 %      Immat GRANS % 0 %      Lymphocytes Relative 10 (L) %      Monocytes Relative 15 (H) %      Eosinophils Relative 0 %      Basophils Relative 0 %      Neutrophils Absolute 5 98 Thousands/µL      Immature Grans Absolute 0 03 Thousand/uL      Lymphocytes Absolute 0 81 Thousands/µL      Monocytes Absolute 1 25 (H) Thousand/µL      Eosinophils Absolute 0 03 Thousand/µL Basophils Absolute 0 03 Thousands/µL     Protime-INR [[id number]]  (Abnormal) Collected:  02/09/19 2021    Lab Status:  Final result Specimen:  Blood from Arm, Right Updated:  02/09/19 2053     Protime 14 4 (H) seconds      INR 1 19 (H)    APTT [[id number]]  (Normal) Collected:  02/09/19 2021    Lab Status:  Final result Specimen:  Blood from Arm, Right Updated:  02/09/19 2053     PTT 32 seconds     Basic metabolic panel [[id number]]  (Abnormal) Collected:  02/09/19 2021    Lab Status:  Final result Specimen:  Blood from Arm, Right Updated:  02/09/19 2052     Sodium 139 mmol/L      Potassium 4 6 mmol/L      Chloride 100 mmol/L      CO2 34 (H) mmol/L      ANION GAP 5 mmol/L      BUN 32 (H) mg/dL      Creatinine 1 17 mg/dL      Glucose 135 mg/dL      Calcium 8 9 mg/dL      eGFR 54 ml/min/1 73sq m     Narrative:         National Kidney Disease Education Program recommendations are as follows:  GFR calculation is accurate only with a steady state creatinine  Chronic Kidney disease less than 60 ml/min/1 73 sq  meters  Kidney failure less than 15 ml/min/1 73 sq  meters  Hepatic function panel [[id number]]  (Abnormal) Collected:  02/09/19 2021    Lab Status:  Final result Specimen:  Blood from Arm, Right Updated:  02/09/19 2052     Total Bilirubin 1 50 (H) mg/dL      Bilirubin, Direct 0 83 (H) mg/dL      Alkaline Phosphatase 120 (H) U/L      AST 30 U/L      ALT 27 U/L      Total Protein 7 0 g/dL      Albumin 3 0 (L) g/dL     Troponin I [[id number]]  (Abnormal) Collected:  02/09/19 2021    Lab Status:  Final result Specimen:  Blood from Arm, Right Updated:  02/09/19 2052     Troponin I 0 05 (H) ng/mL                  Imaging Studies:   CT chest abdomen pelvis w contrast   Final Result by Chace Tirado MD (02/09 2201)   1  Small bilateral pleural effusions  Bibasilar and right middle atelectasis/infiltrate  Left lingular scarring/atelectasis  2   No acute findings in the abdomen pelvis     3   Aneurysmal ascending thoracic aorta measuring 4 3 x 4 4 cm  Workstation performed: [id number]         CT head without contrast   Final Result by Lorenzo Curry MD (02/09 2149)      No acute intracranial abnormality  Workstation performed: [id number]         CT cervical spine without contrast   Final Result by Lorenzo Curry MD (02/09 2149)      No cervical spine fracture or traumatic malalignment                     Workstation performed: [id number]             Other Studies:  None    Code Status: Prior  Advance Directive and Living Will:      Power of :    POLST:      Procedures  Procedures       Phone Contacts  ED Phone Contact     ED Course  ED Course as of Feb 09 2252   Sat Feb 09, 2019 2240 MCV: (!) 110         MDM  Number of Diagnoses or Management Options  Contusion of face, initial encounter:   Contusion of left chest wall, initial encounter:   Fall on same level, initial encounter:      Amount and/or Complexity of Data Reviewed  Clinical lab tests: ordered and reviewed  Tests in the radiology section of CPT®: ordered and reviewed  Obtain history from someone other than the patient: yes  Review and summarize past medical records: yes  Independent visualization of images, tracings, or specimens: yes        Disposition  Final diagnoses:   Fall on same level, initial encounter   Contusion of face, initial encounter   Contusion of left chest wall, initial encounter     Time reflects when diagnosis was documented in both MDM as applicable and the Disposition within this note     Time User Action Codes Description Comment    2/9/2019 10:49 PM Lila Greer [place] Valladares Fall on same level, initial encounter     2/9/2019 10:49 PM Kristina Bay [address] Contusion of face, initial encounter     2/9/2019 10:49 PM Lila Finney Add [S20 212A] Contusion of left chest wall, initial encounter       ED Disposition     ED Disposition Condition Date/Time Comment    Discharge  Sat Feb 9, 2019 10:49 PM Cady Cagle discharge to home/self care  Condition at discharge: Stable        Follow-up Information     Follow up With Specialties Details Why [id number] Veronica Segundo MD Family Medicine Call in 2 days As needed [address]  [phone]          Patient's Medications   Discharge Prescriptions    No medications on file     No discharge procedures on file        ED Provider  Electronically Signed by         Yue Spence DO  02/09/19 1645

## 2019-02-10 NOTE — ED PROCEDURE NOTE
PROCEDURE  ECG 12 Lead Documentation  Date/Time: 2/9/2019 11:03 PM  Performed by: Lola Cuellar by: Miguel Guerrero     ECG reviewed by me, the ED Provider: yes    Patient location:  ED  Previous ECG:     Previous ECG:  Compared to current    Comparison ECG info:  Compared to August 11, 2016    Similarity:  No change  Interpretation:     Interpretation comment:  Atrial fibrillation with controlled ventricular response  Natalia Daily Her ventricular or aberrantly conducted complexes  Left axis deviation  Left bundle-branch block           Catarino Tran DO  02/09/19 5027

## 2019-02-10 NOTE — DISCHARGE INSTRUCTIONS
Facial Contusion   WHAT YOU NEED TO KNOW:   A facial contusion is a bruise that appears on your face after an injury  A bruise happens when small blood vessels tear but skin does not  When blood vessels tear, blood leaks into nearby tissue, such as soft tissue or muscle  You may develop swelling and bruising around your eyes if your bruise is on your brow, forehead, or the bridge of your nose  DISCHARGE INSTRUCTIONS:   Return to the emergency department if:   · You have a fever  · You have watery, clear fluid draining from your nose  · You have changes in your vision or eye appearance  · You have changes or pain with eye movement  · You have tingling or numbness in or near the injured area  Contact your healthcare provider if:   · You find a new lump in the injured area  · Your symptoms do not improve with treatment  · You have questions or concerns about your condition or care  Medicines:   · Acetaminophen  decreases pain  It is available without a doctor's order  Ask how much to take and how often to take it  Follow directions  Acetaminophen can cause liver damage if not taken correctly  · Take your medicine as directed  Contact your healthcare provider if you think your medicine is not helping or if you have side effects  Tell him of her if you are allergic to any medicine  Keep a list of the medicines, vitamins, and herbs you take  Include the amounts, and when and why you take them  Bring the list or the pill bottles to follow-up visits  Carry your medicine list with you in case of an emergency  Ice:  Apply ice on your bruise for 15 to 20 minutes every hour or as directed  Use an ice pack, or put crushed ice in a plastic bag  Cover it with a towel  Ice helps prevent tissue damage and decreases swelling and pain  Elevation:  Sleep with your head elevated to help decrease swelling     Help your contusion heal:  Do not  massage the area or put heating pads or other warming devices on the bruise right after your injury  Heat and massage may slow the healing of the area  Follow up with your healthcare provider as directed: You may need to return within a week to have your injury checked again  Write down any questions you have so you remember to ask them in your follow-up visits  Prevent a facial contusion:   · Use safety belts and child restraints  · Use safety helmets when you ride a bicycle or motorcycle  · Use a mouth and face guard during sports  © 2017 [facility] [facility] Information is for End User's use only and may not be sold, redistributed or otherwise used for commercial purposes  All illustrations and images included in CareNotes® are the copyrighted property of A D A M , Inc  or Cesar Leblanc  The above information is an  only  It is not intended as medical advice for individual conditions or treatments  Talk to your doctor, nurse or pharmacist before following any medical regimen to see if it is safe and effective for you  Rib Contusion   WHAT YOU NEED TO KNOW:   A rib contusion is a bruise on one or more of your ribs  DISCHARGE INSTRUCTIONS:   Return to the emergency department if:   · You have increased chest pain  · You have shortness of breath  · You start to cough up blood  · Your pain does not improve with pain medicine  Contact your healthcare provider if:   · You have a cough  · You have a fever  · You have questions or concerns about your condition or care  Medicines: You may need any of the following:  · NSAIDs , such as ibuprofen, help decrease swelling, pain, and fever  This medicine is available with or without a doctor's order  NSAIDs can cause stomach bleeding or kidney problems in certain people  If you take blood thinner medicine, always ask if NSAIDs are safe for you  Always read the medicine label and follow directions   Do not give these medicines to children under 6 months of age without direction from your child's healthcare provider  · Prescription pain medicine  may be given  Ask how to take this medicine safely  · Take your medicine as directed  Contact your healthcare provider if you think your medicine is not helping or if you have side effects  Tell him of her if you are allergic to any medicine  Keep a list of the medicines, vitamins, and herbs you take  Include the amounts, and when and why you take them  Bring the list or the pill bottles to follow-up visits  Carry your medicine list with you in case of an emergency  Deep breathing:   · To help prevent pneumonia, take 10 deep breaths every hour, even when you wake up during the night  Brace your ribs with your hands or a pillow while you take deep breaths or cough  This will help decrease your pain  · You may need to use an incentive spirometer to help you take deeper breaths  Put the plastic piece into your mouth and take a very deep breath  Hold your breath as long as you can  Then let out your breath  Do this 10 times in a row every hour while you are awake  Rest:  Rest your ribs to decrease swelling and allow the injury to heal faster  Avoid activities that may cause more pain or damage to your ribs  As your pain decreases, begin movements slowly  Ice:  Ice helps decrease swelling and pain  Ice may also help prevent tissue damage  Use an ice pack or put crushed ice in a plastic bag  Cover it with a towel and place it on your bruised area for 15 to 20 minutes every hour as directed  Follow up with your healthcare provider as directed:  Write down your questions so you remember to ask them during your visits  © 2017 [address] [employer] Information is for End User's use only and may not be sold, redistributed or otherwise used for commercial purposes  All illustrations and images included in CareNotes® are the copyrighted property of [employer]  or Cesar Leblanc    The above information is an  only  It is not intended as medical advice for individual conditions or treatments  Talk to your doctor, nurse or pharmacist before following any medical regimen to see if it is safe and effective for you  Fall Prevention for Older Adults   WHAT YOU NEED TO KNOW:   As you age, your muscles weaken and your risk for falls increases  Your risk also increases if you take medicines that make you sleepy or dizzy  You may also be at risk if you have vision or joint problems, have low blood pressure, or are not active  DISCHARGE INSTRUCTIONS:   Call 911 or have someone else call if:   · You have fallen and are unconscious  · You have fallen and cannot move part of your body  Contact your healthcare provider if:   · You have fallen and have pain or a headache  · You have questions or concerns about your condition or care  Fall prevention tips:   · Stay active  Exercise can help strengthen your muscles and improve your balance  Your healthcare provider may recommend water aerobics, walking, or [place]  He may also recommend physical therapy to improve your coordination  Never start an exercise program without asking your healthcare provider first     · Wear shoes that fit well and have soles that   Wear shoes both inside and outside  Use slippers with good   Avoid shoes with high heels  · Use assistive devices as directed  Your healthcare provider may suggest that you use a cane or walker to help you keep your balance  You may need to have grab bars put in your bathroom near the toilet or in the shower  · Stand or sit up slowly  This may help you keep your balance and prevent falls  · Wear a personal alarm  This is a device that allows you to call 911 if you need help  Ask for more information on personal alarms  · Manage your medical conditions  Keep all appointments with your healthcare providers  Visit your eye doctor as directed    Home safety tips:   · Add items to prevent falls in the bathroom  Put nonslip strips on your bath or shower floor to prevent you from slipping  Use a bath mat if you do not have carpet in the bathroom  This will prevent you from falling when you step out of the bath or shower  Use a shower seat so you do not need to stand while you shower  Sit on the toilet or a chair in your bathroom to dry yourself and put on clothing  This will prevent you from losing your balance from drying or dressing yourself while you are standing  · Keep paths clear  Remove books, shoes, and other objects from walkways and stairs  Place cords for telephones and lamps out of the way so that you do not need to walk over them  Tape them down if you cannot move them  Remove small rugs  If you cannot remove a rug, secure it with double-sided tape  This will prevent you from tripping  · Install bright lights in your home  Use night lights to help light paths to the bathroom or kitchen  Always turn on the light before you start walking  · Keep items you use often on shelves within reach  Do not use a step stool to help you reach an item  · Paint or place reflective tape on the edges of your stairs  This will help you see the stairs better  Follow up with your healthcare provider as directed:  Write down your questions so you remember to ask them during your visits  © 2017 [employer] [employer] Information is for End User's use only and may not be sold, redistributed or otherwise used for commercial purposes  All illustrations and images included in CareNotes® are the copyrighted property of A D A M , Inc  or Cesar Leblanc  The above information is an  only  It is not intended as medical advice for individual conditions or treatments  Talk to your doctor, nurse or pharmacist before following any medical regimen to see if it is safe and effective for you

## 2019-02-11 PROBLEM — R41.82 ALTERED MENTAL STATUS: Status: ACTIVE | Noted: 2019-01-01

## 2019-02-11 PROBLEM — I25.5 ISCHEMIC CARDIOMYOPATHY: Status: ACTIVE | Noted: 2019-01-01

## 2019-02-11 PROBLEM — I21.9 MYOCARDIAL INFARCTION (HCC): Status: ACTIVE | Noted: 2019-01-01

## 2019-02-11 PROBLEM — J45.909 ASTHMA: Status: ACTIVE | Noted: 2019-01-01

## 2019-02-12 PROBLEM — I95.9 HYPOTENSION: Status: ACTIVE | Noted: 2019-01-01

## 2019-02-12 NOTE — ASSESSMENT & PLAN NOTE
Continue Metoprolol tartrate 50 mg po qd  IV Lasix was ordered with acute   signs of CHF    Family has requested hospice care and consult is placed

## 2019-02-12 NOTE — ASSESSMENT & PLAN NOTE
Continue albuterol q6h prn wheezing-will give IV steroids for comfort given his exacerbation of COPD-O2 sats will be followed and oxygen use p r n   To keep sat greater than 90%  patient has since had increased secretions workup placed on a scopolamine patch- suction prn

## 2019-02-12 NOTE — SOCIAL WORK
Received consult for hospice  Spoke with patients family at bedside and they are interested in [employer]  Given freedom of choice  Referral via [facility] to [facility]  Called and spoke with Kirstin, [facility] and she will contact family

## 2019-02-12 NOTE — PROGRESS NOTES
Patients family requesting to discontinue IV fluids and remove oxygen  Dr Chau Gunn notified and okayed to make PRN

## 2019-02-12 NOTE — PROGRESS NOTES
Death Pronouncement Note    Called to bedside by RN  Patient is identified visually and identification confirmed with identification bracelet  All lines and tubes intact  Patient unresponsive to stimuli  No spontaneous respirations  No palpable pulse or audible heart sounds  Pupils fixed bilaterally  Asystolic on two contiguous leads  Time of death: 12    Family present at bedside  Attending notified  [place] office called by RN

## 2019-02-12 NOTE — DISCHARGE SUMMARY
Discharge Summary - Juan Cook 80 y o  male MRN: [id number]    Unit/Bed#: [address] [place] [address] 201-02 Encounter: [id number] PCP: Khai Mcginnis MD    Admission Date:   Admission Orders (From admission, onward)    Ordered        02/11/19 2212  Inpatient Admission (expected length of stay for this patient Order details is greater than two midnights)  Once     Order ID Start Status   [id number] 02/11/19 2213 Completed                Admitting Diagnosis: SOB (shortness of breath) [R06 02]  Confused [R41 0]  Altered mental status, unspecified altered mental status type [R41 82]    HPI: Per A  David 80 y o  male with a PMH for systolic CHF, COPD, mild aortic stenosis, atrial fibrillation who presents with altered mental status  Patient was seen in the ED 01/29/2019 for trauma eval after a fall  Patient was brought to ED on the advice of the cardiologist   Patient's family called the cardiologist, from [facility], requesting that his pacemaker be turned off for comfort care  Due to the impending snow storm the cardiologist told the family to call 911 and bring the patient into the ED for eval for hospice care  [address] was contacted and they will reach out tomorrow in regards to his pacemaker  Patient will be admitted with a consult placed for hospice care  According to the daughter the patient has having visual hallucinations  I spoke to the family in detail regarding the patient's wishes as well as their own  They will accept IV fluids as well as antibiotics if it is to make the patient more comfortable  The do not want extraordinary measures taken  Procedures Performed: No orders of the defined types were placed in this encounter  Summary of Hospital Course:  After presenting to the emergency department for evaluation of hospice care patient's family expressed interest to for inpatient hospice house  He was made a level 4- comfort care and referrals were sent to multiple hospice facilities  During which time patient was kept comfortable with IV fluids, breathing treatments and diuretics  Nursing measures were in place to assist with comfort  Patient began to slowly decline throughout the day and  at 5:51 p m  Family at bedside, emotional support provided  Spiritual care offered       Significant Findings, Care, Treatment and Services Provided: None    Complications: None    Disposition:      Final Diagnosis:  Acute on chronic CHF and severe COPD - poor prognosis    Resolved Problems  Date Reviewed: 2019    None          Condition at Time of Death: Poor    Date, Time and Cause of Death    Date of Death:  19  Time of Death:   5:51 PM  Preliminary Cause of Death:  CHF, acute on chronic ([facility] )  Entered by:  RYANN Bueno[JF1 1]     Attribution     JF1 1 Khang Bueno 19 18:10

## 2019-02-12 NOTE — ASSESSMENT & PLAN NOTE
Patient is relative hypotension this morning-will hold on diuretics Ace inhibitor and his metoprolol for now    Will give low level IV fluids until family discuss this with hospice on what their wishes and plans are

## 2019-02-12 NOTE — ASSESSMENT & PLAN NOTE
Hospice Consult  IVF and antibiotics are allowed per medical POA to make patient more comfortable  UA pending

## 2019-02-12 NOTE — H&P
[address] Internal Medicine    H&P- Carmen Cueva 10/12/1927, 80 y o  male MRN: [id number]    Unit/Bed#: ED 05 Encounter: [id number]    Primary Care Provider: Beth De La Cruz MD   Date and time admitted to hospital: 2/11/2019  8:55 PM        * Altered mental status  [address]  IVF and antibiotics are allowed per medical POA to make patient more comfortable  UA pending    AICD (automatic cardioverter/defibrillator) present  [address] [place] [address] states:  Tape a magnet over the pacemaker  to remove the defibrillator function  Once determination made for hospice care they will remotely turn off the pacemaker       Chronic systolic congestive heart failure (HCC)  Assessment & Plan  Continue Metoprolol tartrate 50 mg po qd    COPD (chronic obstructive pulmonary disease) (HCC)  Assessment & Plan  Continue albuterol q6h prn wheezing    Hypertension  Assessment & Plan  Continue home meds:  Lasix 80 mg p o  B i d   Metoprolol tartrate 50 mg p o  B i d   Quinapril 40 mg p o  HS    VTE Prophylaxis: None due to comfort care  / reason for no mechanical VTE prophylaxis None due to comfort care   Code Status:  Level 4 comfort care  POLST: There is no POLST form on file for this patient (pre-hospital)  Discussion with family:  Spoke with family in detail regarding goals of care and what will be accepted  IVF and antibiotics to make patient more comfortable are acceptable, otherwise no extraordinary measures are to be taken  Anticipated Length of Stay:  Patient will be admitted on an Inpatient basis with an anticipated length of stay of  greater than 2 midnights  Justification for Hospital Stay:  Altered mental status and requesting hospice care    Chief Complaint:   Altered mental status    History of Present Illness:    Carmen Cueva is a 80 y o  male with a PMH for systolic CHF, COPD, mild aortic stenosis, atrial fibrillation who presents with altered mental status  Patient was seen in the ED 01/29/2019 for trauma eval after a fall  Patient was brought to ED on the advice of the cardiologist   Patient's family called the cardiologist, from [address] [place] [address] [address], requesting that his pacemaker be turned off for comfort care  Due to the impending snow storm the cardiologist told the family to call 911 and bring the patient into the ED for eval for hospice care  [employer] was contacted and they will reach out tomorrow in regards to his pacemaker  Patient will be admitted with a consult placed for hospice care  According to the daughter the patient has having visual hallucinations  I spoke to the family in detail regarding the patient's wishes as well as their own  They will accept IV fluids as well as antibiotics if it is to make the patient more comfortable  The do not want extraordinary measures taken  Review of Systems:    Review of Systems   Unable to perform ROS: Mental status change       Past Medical and Surgical History:     Past Medical History:   Diagnosis Date    [facility]fib [facility])     Cardiac disease     [facility]     [facility]     Granddaughter denies   Hypertension        Past Surgical History:   Procedure Laterality Date    CARDIAC DEFIBRILLATOR PLACEMENT      CARDIAC DEFIBRILLATOR PLACEMENT      CARDIAC PACEMAKER PLACEMENT      CORONARY ARTERY BYPASS GRAFT         Meds/Allergies:    Prior to Admission medications    Medication Sig Start Date End Date Taking? Authorizing Provider   albuterol (PROVENTIL HFA,VENTOLIN HFA) 90 mcg/act inhaler Inhale 2 puffs every 6 (six) hours as needed for wheezing  Historical Provider, MD   aspirin 81 MG tablet Take 81 mg by mouth daily      Historical Provider, MD   furosemide (LASIX) 80 mg tablet Take 80 mg by mouth 2 (two) times a day    Historical Provider, MD   melatonin 1 mg Take 3 mg by mouth daily at bedtime    Historical Provider, MD   metoprolol tartrate (LOPRESSOR) 50 mg tablet Take 50 mg by mouth 2 (two) times a day  Historical Provider, MD   quinapril (ACCUPRIL) 40 MG tablet Take 40 mg by mouth daily at bedtime  Historical Provider, MD     I have reviewed home medications with patient family member  Allergies: No Known Allergies    Social History:     Marital Status: /Civil Union   Occupation:  None  Patient Pre-hospital Living Situation:  Family  Patient Pre-hospital Level of Mobility:  Full  Patient Pre-hospital Diet Restrictions:  None  Substance Use History:   Social History     Substance and Sexual Activity   Alcohol Use Yes    Alcohol/week: 4 2 oz    Types: 7 Cans of beer per week     Social History     Tobacco Use   Smoking Status Former Smoker    Last attempt to quit: 1963    Years since quittin 1   Smokeless Tobacco Never Used     Social History     Substance and Sexual Activity   Drug Use No       Family History:    Family History   Problem Relation Age of Onset    Heart disease Mother     Heart disease Father        Physical Exam:     Vitals:   Blood Pressure: 129/84 (19)  Pulse: 98 (19)  Temperature: 99 9 °F (37 7 °C) (19)  Temp Source: Temporal (19)  Respirations: 20 (19)  Height: 5' 2" (157 5 cm) (19)  Weight - Scale: 79 4 kg (175 lb) (19)  SpO2: 91 % (19)    Physical Exam   Constitutional: Vital signs are normal  He appears well-developed and well-nourished  Non-toxic appearance  No distress  HENT:   Head: Normocephalic and atraumatic  Mouth/Throat: Oropharynx is clear and moist and mucous membranes are normal  Normal dentition  No oropharyngeal exudate  Eyes: Pupils are equal, round, and reactive to light  Conjunctivae are normal  Right eye exhibits no discharge  Left eye exhibits no discharge  No scleral icterus  Neck: No JVD present  No tracheal deviation and no erythema present     Cardiovascular: Normal rate, regular rhythm, normal heart sounds, intact distal pulses and normal pulses  Exam reveals no gallop and no friction rub  No murmur heard  Pulmonary/Chest: Effort normal  No accessory muscle usage or stridor  No respiratory distress  He has no decreased breath sounds  He has no wheezes  He has rales  Abdominal: Soft  Bowel sounds are normal  He exhibits no distension and no mass  There is no tenderness  There is no rebound  Musculoskeletal: He exhibits edema (+2 pitting b/l)  He exhibits no tenderness or deformity  Neurological: He is alert  He is disoriented  GCS eye subscore is 4  GCS verbal subscore is 5  GCS motor subscore is 6  Skin: Skin is warm and dry  No rash noted  He is not diaphoretic  No erythema  No pallor  Psychiatric: He has a normal mood and affect  His behavior is normal    Nursing note and vitals reviewed  Additional Data:     Lab Results: I have personally reviewed pertinent reports  Results from last 7 days   Lab Units 02/09/19 2021   WBC Thousand/uL 8 13   HEMOGLOBIN g/dL 12 5   HEMATOCRIT % 38 2   PLATELETS Thousands/uL 124*   NEUTROS PCT % 75   LYMPHS PCT % 10*   MONOS PCT % 15*   EOS PCT % 0     Results from last 7 days   Lab Units 02/09/19 2021   SODIUM mmol/L 139   POTASSIUM mmol/L 4 6   CHLORIDE mmol/L 100   CO2 mmol/L 34*   BUN mg/dL 32*   CREATININE mg/dL 1 17   ANION GAP mmol/L 5   CALCIUM mg/dL 8 9   ALBUMIN g/dL 3 0*   TOTAL BILIRUBIN mg/dL 1 50*   ALK PHOS U/L 120*   ALT U/L 27   AST U/L 30   GLUCOSE RANDOM mg/dL 135     Results from last 7 days   Lab Units 02/09/19 2021   INR  1 19*                   Imaging: I have personally reviewed pertinent reports  No orders to display       EKG, Pathology, and Other Studies Reviewed on Admission:   · EKG:  Atrial fibrillation with PVCs  Vent   rate 83 BPM  TX interval * ms  QRS duration 156 ms  QT/QTc 426/500 ms  P-R-T axes * -57 152  /69 mmHg    Allscripts / Epic Records Reviewed: Yes     ** Please Note: This note has been constructed using a voice recognition system   **

## 2019-02-12 NOTE — ED PROVIDER NOTES
History  Chief Complaint   Patient presents with    Altered Mental Status     patient presents to the ED via EMS  EMS reports patient has had increased altered mental status this evening and trouble urinating  Patient was recently seen in the ED for a fall  79 yo M with PMH of afib, CAD, CHF, COPD presents with family for evaluation for hospice  They report he is more confused and combative compared to normal  No specific complaints  No dyspnea  Leg swelling better than it usually is  No recent fall since eval last week for fall  History provided by:  EMS personnel, medical records and relative  History limited by:  Dementia   used: No    Altered Mental Status   Presenting symptoms: behavior changes and confusion    Severity:  Moderate  Most recent episode: Today  Episode history:  Continuous  Progression:  Worsening  Chronicity:  Chronic  Context: dementia    Context: not recent illness and not recent infection    Associated symptoms: no abdominal pain, no bladder incontinence, no decreased appetite, no difficulty breathing, no headaches, no seizures and no vomiting        Prior to Admission Medications   Prescriptions Last Dose Informant Patient Reported? Taking? albuterol (PROVENTIL HFA,VENTOLIN HFA) 90 mcg/act inhaler   Yes No   Sig: Inhale 2 puffs every 6 (six) hours as needed for wheezing  aspirin 81 MG tablet   Yes No   Sig: Take 81 mg by mouth daily  furosemide (LASIX) 80 mg tablet   Yes No   Sig: Take 80 mg by mouth 2 (two) times a day   melatonin 1 mg   Yes No   Sig: Take 3 mg by mouth daily at bedtime   metoprolol tartrate (LOPRESSOR) 50 mg tablet   Yes No   Sig: Take 50 mg by mouth 2 (two) times a day  quinapril (ACCUPRIL) 40 MG tablet   Yes No   Sig: Take 40 mg by mouth daily at bedtime        Facility-Administered Medications: None       Past Medical History:   Diagnosis Date    A-fib [facility])     Cardiac disease     [facility]congest[facility]) ([facility] )     COPD [facility]chronic obstructive [facility] disease) ([facility] )     Granddaughter denies   Hypertension        Past Surgical History:   Procedure Laterality Date    CARDIAC DEFIBRILLATOR PLACEMENT      CARDIAC DEFIBRILLATOR PLACEMENT      CARDIAC PACEMAKER PLACEMENT      CORONARY ARTERY BYPASS GRAFT         Family History   Problem Relation Age of Onset    Heart disease Mother     Heart disease Father      I have reviewed and agree with the history as documented  Social History     Tobacco Use    Smoking status: Former Smoker     Last attempt to quit:      Years since quittin 1    Smokeless tobacco: Never Used   Substance Use Topics    Alcohol use: Yes     Alcohol/week: 4 2 oz     Types: 7 Cans of beer per week    Drug use: No        Review of Systems   Unable to perform ROS: Dementia   Constitutional: Negative for decreased appetite  Gastrointestinal: Negative for abdominal pain and vomiting  Genitourinary: Negative for bladder incontinence  Neurological: Negative for seizures and headaches  Psychiatric/Behavioral: Positive for confusion  Physical Exam  Physical Exam   Constitutional: He appears well-developed and well-nourished  Non-toxic appearance  He does not appear ill  No distress  HENT:   Head: Normocephalic and atraumatic  Right Ear: External ear normal    Left Ear: External ear normal    Nose: Nose normal    Eyes: Pupils are equal, round, and reactive to light  Conjunctivae and EOM are normal  Right eye exhibits no discharge  Left eye exhibits no discharge  No scleral icterus  Neck: Normal range of motion  Neck supple  No JVD present  No tracheal deviation present  Cardiovascular: Normal rate, regular rhythm, normal heart sounds and intact distal pulses  Exam reveals no gallop and no friction rub  No murmur heard  Pulmonary/Chest: Effort normal  No stridor  No respiratory distress  He has no wheezes  He has rales (at bases)  He exhibits no tenderness  Abdominal: Soft  Bowel sounds are normal  He exhibits no distension  There is no tenderness  There is no rebound and no guarding  Musculoskeletal: Normal range of motion  He exhibits edema (2+ pitting b/l)  He exhibits no tenderness or deformity  Lymphadenopathy:     He has no cervical adenopathy  Neurological: He is alert  Pt alert, confused, moving all extremities spontaneously, trying to get off bed, saying he is thirsty  Doesn't follow commands  Skin: Skin is warm and dry  No rash noted  He is not diaphoretic  Nursing note and vitals reviewed  Vital Signs  ED Triage Vitals [02/11/19 2100]   Temperature Pulse Respirations Blood Pressure SpO2   99 9 °F (37 7 °C) 62 20 127/95 95 %      Temp Source Heart Rate Source Patient Position - Orthostatic VS BP Location FiO2 (%)   Temporal Monitor Lying Left arm --      Pain Score       No Pain           Vitals:    02/11/19 2100 02/11/19 2115   BP: 127/95 129/84   Pulse: 62 98   Patient Position - Orthostatic VS: Lying Lying       Visual Acuity  Visual Acuity      Most Recent Value   L Pupil Size (mm)  3   R Pupil Size (mm)  3   L Pupil Shape  Round   R Pupil Shape  Round          ED Medications  Medications   OLANZapine (ZyPREXA ZYDIS) dispersible tablet 5 mg (5 mg Oral Given 2/11/19 2120)       Diagnostic Studies  Results Reviewed     Procedure Component Value Units Date/Time    UA w Reflex to Microscopic w Reflex to Culture [[id number]]     Lab Status:  No result Specimen:  Urine                  No orders to display              Procedures  Procedures       Phone Contacts  ED Phone Contact    ED Course  ED Course as of Feb 11 2316   Mon Feb 11, 2019 2201 Discussed with [employer] - will call back to discuss turning off defibrillator  Discussed with Bruna Ramos, pt will be made hospice  Family wants very limited testing - ok with urine testing only  Otherwise just want comfort  2315 Discussed with [employer] rep - recommends taping a magnet over defirbillator   Will not affect pacing at all  Once it is determined in AM what type of hospice, they will send a rep to permanently turn off [place]br[place]rita Rossi updated  Magnet applied  MDM  Number of Diagnoses or Management Options  Altered mental status, unspecified altered mental status type: new and does not require workup     Amount and/or Complexity of Data Reviewed  Clinical lab tests: reviewed  Tests in the radiology section of CPT®: reviewed  Tests in the medicine section of CPT®: reviewed  Review and summarize past medical records: yes  Discuss the patient with other providers: yes    Risk of Complications, Morbidity, and/or Mortality  Presenting problems: moderate  Diagnostic procedures: minimal  Management options: minimal    Patient Progress  Patient progress: stable      Disposition  Final diagnoses: Altered mental status, unspecified altered mental status type     Time reflects when diagnosis was documented in both MDM as applicable and the Disposition within this note     Time User Action Codes Description Comment    2/11/2019 11:15 PM Rosaura Stack Add [R41 82] Altered mental status, unspecified altered mental status type       ED Disposition     ED Disposition Condition Date/Time Comment    Admit Stable Mon Feb 11, 2019 10:12 PM Case was discussed with Ken Hi and the patient's admission status was agreed to be Admission Status: inpatient status to the service of Dr Kristin Hsu   Follow-up Information    None         Patient's Medications   Discharge Prescriptions    No medications on file     No discharge procedures on file      ED Provider  Electronically Signed by           Simin Talamantes MD  02/11/19 2787

## 2019-02-12 NOTE — DEATH NOTE
INPATIENT DEATH NOTE  Cecilia Milner 80 y o  male MRN: [id number]  Unit/Bed#: [address] [place] [address] Encounter: [id number]    Date, Time and Cause of Death    Date of Death:  19  Time of Death:   5:51 PM  Preliminary Cause of Death:  CHF, acute on chronic ([facility] )  Entered by:  RYANN Arroyo[JF1 1]     Attribution     JF1 1 Khang Arroyo 19 18:10           Patient's Information  Pronounced by: RYANN Arroyo  Did the patient's death occur in the ED?: No  Did the patient's death occur in the OR?: No  Did the patient's death occur less than 10 days post-op?: No  Did the patient's death occur within 24 hours of admission?: Yes  Was code status DNR at the time of death?: Yes    PHYSICAL EXAM:  Unresponsive to noxious stimuli, Spontaneous respirations absent, Breath sounds absent, Carotid pulse absent, Heart sounds absent, Pupillary light reflex absent and Corneal blink reflex absent    Medical Examiner notification criteria:  Patient  within 24 hours of arrival to hospital   Medical Examiner's office notified?:  Yes   Medical Examiner accepted case?:  No  Name of Medical Examiner: Omayra Zayas    Family Notification  Was the family notified?: Yes  Date Notified: 19  Time Notified: 407  Notified by: Khang Arroyo  Name of Family Notified of Death: Kamran Hugo    Relationship to Patient: Daughter  Family Notification Route:  At bedside  Was the family told to contact a  home?: Yes    Autopsy Options:  Post-mortem examination declined by next of kin    Primary Service Attending Physician notified?:  yes - Attending:  Lance Guillory MD    Physician/Resident responsible for completing Discharge Summary:  Khang Arroyo

## 2019-02-12 NOTE — HOSPICE NOTE
Spoke with Jose Pacheco and let her know that there are no open beds in the ipu  I also called and talked with pts daughter Lesvia Holman as well   I will continue to follow pt as he appears to be IPU appropriate

## 2019-02-12 NOTE — SOCIAL WORK
Received call from Jose E Elder, [facility]  As per Jose E Elder there are no IP Hospice beds available  She will contact family

## 2019-02-12 NOTE — ED NOTES
Waiting on pharmacy for scopolamine patch  Notified [facility] patient comfort care       Lisa Orona, MISHA  02/12/19 ZENY Clifton RN  02/12/19 1526

## 2019-02-12 NOTE — ASSESSMENT & PLAN NOTE
Continue home meds:  Lasix 80 mg p o  B i d   Metoprolol tartrate 50 mg p o  B i d   Quinapril 40 mg p o  HS

## 2019-02-12 NOTE — ED NOTES
[address] provided hospice nurse phone number, [phone]    Call for evaluation     Thaddeus Kang RN  02/12/19 8602

## 2019-02-12 NOTE — ASSESSMENT & PLAN NOTE
[employer] states:  Tape a magnet over the pacemaker  to remove the defibrillator function  Once determination made for hospice care they will remotely turn off the pacemaker  Argelia Puente

## 2019-02-12 NOTE — PROGRESS NOTES
Progress Note - Ramona Alisa 10/12/1927, 80 y o  male MRN: [id number]    Unit/Bed#: ED 05 Encounter: [id number]    Primary Care Provider: Sommer Nair MD   Date and time admitted to hospital: 2019  8:55 PM        COPD (chronic obstructive pulmonary disease) ([facility] [address] )  Assessment & Plan  Continue albuterol q6h prn wheezing-will give IV steroids for comfort given his exacerbation of COPD-O2 sats will be followed and oxygen use p r n  To keep sat greater than 90%  patient has since had increased secretions workup placed on a scopolamine patch- suction prn    Chronic systolic congestive heart failure (HCC)  Assessment & Plan  Continue Metoprolol tartrate 50 mg po qd  IV Lasix was ordered with acute   signs of CHF  Family has requested hospice care and consult is placed    Hypotension  Assessment & Plan  Patient is relative hypotension this morning-will hold on diuretics Ace inhibitor and his metoprolol for now  Will give low level IV fluids until family discuss this with hospice on what their wishes and plans are        VTE Prophylaxis: in place    Patient Centered Rounds: I rounded with patient's nurse    Current Length of Stay: 1 day(s)    Current Patient Status: Inpatient    Certification Statement: Pt requires additional inpatient hospital stay due to: see assessment and plan        Subjective:   Patient is restless and confused  With repositioning he still has difficulty with breathing and increased secretions    Staff reports drop in blood pressure this morning and has had variable heart rate in the 100 since then at times dropping briefly down into the 30s currently at 90     All other ROS are negative    Objective:     Vitals:   Temp (24hrs), Av 7 °F (37 1 °C), Min:97 5 °F (36 4 °C), Max:99 9 °F (37 7 °C)    Temp:  [97 5 °F (36 4 °C)-99 9 °F (37 7 °C)] 97 5 °F (36 4 °C)  HR:  [] 97  Resp:  [20-33] 21  BP: ()/(48-95) 78/48  SpO2:  [91 %-96 %] 94 %  Body mass index is 32 01 kg/m²  Input and Output Summary (last 24 hours): Intake/Output Summary (Last 24 hours) at 2/12/2019 0944  Last data filed at 2/12/2019 0612  Gross per 24 hour   Intake 250 ml   Output --   Net 250 ml       Physical Exam:     Physical Exam   Constitutional: He appears distressed  Restless, leaning over to the left and needs help with repositioning  Nonverbal   HENT:   Head: Normocephalic  Mouth/Throat: No oropharyngeal exudate  Difficulty clearing secretions   Eyes: Right eye exhibits no discharge  Left eye exhibits no discharge  Cardiovascular:   Irregular irregular rates currently in the 90   Pulmonary/Chest: He is in respiratory distress  He has wheezes  He has rales  Abdominal: Soft  He exhibits no distension  Nursing note and vitals reviewed  I personally reviewed labs and imaging reports for today        Last 24 Hours Medication List:     Current Facility-Administered Medications:  acetaminophen 650 mg Oral Q6H PRN Karma Million, PA-C    albuterol 2 puff Inhalation Q6H PRN Karma Million, PA-C    aspirin 81 mg Oral Daily Karma Million, PA-C    calcium carbonate 1,000 mg Oral Daily PRN Karma Million, PA-C    furosemide 40 mg Oral BID Seng Hull,     LORazepam 1 mg Intravenous PRN Darrick Lockett MD    melatonin 3 mg Oral HS Karma Million, PA-C    methylPREDNISolone sodium succinate 40 mg Intravenous Q12H [facility] Rosalia Fly, DO    metoprolol tartrate 50 mg Oral BID Karma Million, PA-C    morphine 10 mg Oral Q4H PRN Rosalia Fly, DO    OLANZapine 5 mg Oral HS Karma Million, PA-C    scopolamine 1 patch Transdermal Q72H Rosalia Fly, DO    senna 1 tablet Oral Daily Karma Million, PA-C    sodium chloride 50 mL/hr Intravenous Continuous Karma Million, PA-C Last Rate: 50 mL/hr (02/12/19 3245)          Today, Patient Was Seen By: Seng Hull DO    ** Please Note: Dictation voice to text software may have been used in the creation of this document   **

## 2019-02-13 NOTE — PHYSICIAN ADVISOR
Current patient class: Inpatient  The patient is currently on Hospital Day: 2 at [place] [address]      The patient was admitted to the hospital  on 2/11/19 at 21  for the following diagnosis:  SOB (shortness of breath) [R06 02]  Confused [R41 0]  Altered mental status, unspecified altered mental status type [R41 82]     After review of the relevant documentation, labs, vital signs and test results, the patient is a provider liable case and is most appropriate for OBSERVATION STATUS  In this particular case the patient was admitted to the hospital as an inpatient  The patient however fails to satisfy the 2 midnight benchmark and closer scrutiny of the case is warranted  After review of the patient presentation and relevant labs the patient was most appropriate for observation status on admission  Given that this patient has already been discharged prior to this review they become a provider liable case  Rationale is as follows: The patient is a 80 yrs   Male who presented to the ED at 2/11/2019  8:55 PM with a chief complaint of Altered Mental Status (patient presents to the ED via EMS  EMS reports patient has had increased altered mental status this evening and trouble urinating  Patient was recently seen in the ED for a fall  )     Pt was admitted principally for hospice care  As such a 2MN expectation was not reasonable  The patients vitals on arrival were ED Triage Vitals [02/11/19 2100]   Temperature Pulse Respirations Blood Pressure SpO2   99 9 °F (37 7 °C) 62 20 127/95 95 %      Temp Source Heart Rate Source Patient Position - Orthostatic VS BP Location FiO2 (%)   Temporal Monitor Lying Left arm --      Pain Score       No Pain           Past Medical History:   Diagnosis Date    A-fib [facility])     Cardiac disease     [facility]congestive [facility] failure) ([facility])     COPD (chronic obstructive pulmonary disease) ([facility])     Granddaughter denies      Hypertension      Past Surgical History:   Procedure Laterality Date    CARDIAC DEFIBRILLATOR PLACEMENT      CARDIAC DEFIBRILLATOR PLACEMENT      CARDIAC PACEMAKER PLACEMENT      CORONARY ARTERY BYPASS GRAFT             Consults have been placed to:   None    Vitals:    02/12/19 1015 02/12/19 1030 02/12/19 1100 02/12/19 1500   BP:  (!) 88/56 102/65 135/71   BP Location:  Left arm Left arm Left arm   Pulse: 96 95 97 (!) 113   Resp: 20 18 19 22   Temp:       TempSrc:       SpO2: 95% 95% 92% (!) 72%   Weight:       Height:           Most recent labs:    No results for input(s): WBC, HGB, HCT, PLT, K, NA, CALCIUM, BUN, CREATININE, LIPASE, AMYLASE, INR, TROPONINI, CKTOTAL, AST, ALT, ALKPHOS, BILITOT in the last 72 hours  Scheduled Meds:  Continuous Infusions:  No current facility-administered medications for this encounter  PRN Meds:      Surgical procedures (if appropriate):